# Patient Record
Sex: MALE | Race: WHITE | NOT HISPANIC OR LATINO | Employment: FULL TIME | URBAN - METROPOLITAN AREA
[De-identification: names, ages, dates, MRNs, and addresses within clinical notes are randomized per-mention and may not be internally consistent; named-entity substitution may affect disease eponyms.]

---
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
## 2022-03-27 ENCOUNTER — HOSPITAL ENCOUNTER (EMERGENCY)
Facility: CLINIC | Age: 22
Discharge: HOME | End: 2022-03-27
Attending: PHYSICIAN ASSISTANT | Admitting: PHYSICIAN ASSISTANT

## 2022-03-27 VITALS
RESPIRATION RATE: 15 BRPM | BODY MASS INDEX: 18.48 KG/M2 | TEMPERATURE: 98.2 F | OXYGEN SATURATION: 97 % | HEART RATE: 75 BPM | DIASTOLIC BLOOD PRESSURE: 63 MMHG | WEIGHT: 115 LBS | SYSTOLIC BLOOD PRESSURE: 123 MMHG | HEIGHT: 66 IN

## 2022-03-27 DIAGNOSIS — K08.89 PAIN, DENTAL: ICD-10-CM

## 2022-03-27 PROCEDURE — 99283 EMERGENCY DEPT VISIT LOW MDM: CPT | Mod: 25

## 2022-03-27 PROCEDURE — 64400 NJX AA&/STRD TRIGEMINAL NRV: CPT

## 2022-03-27 PROCEDURE — 250N000009 HC RX 250: Performed by: PHYSICIAN ASSISTANT

## 2022-03-27 RX ORDER — CHLORHEXIDINE GLUCONATE ORAL RINSE 1.2 MG/ML
15 SOLUTION DENTAL 2 TIMES DAILY
Qty: 118 ML | Refills: 0 | Status: SHIPPED | OUTPATIENT
Start: 2022-03-27

## 2022-03-27 RX ORDER — BUPIVACAINE HYDROCHLORIDE AND EPINEPHRINE 5; 5 MG/ML; UG/ML
1.8 INJECTION, SOLUTION PERINEURAL ONCE
Status: COMPLETED | OUTPATIENT
Start: 2022-03-27 | End: 2022-03-27

## 2022-03-27 RX ADMIN — BUPIVACAINE HYDROCHLORIDE AND EPINEPHRINE BITARTRATE 1.8 ML: 5; .005 INJECTION, SOLUTION SUBCUTANEOUS at 20:16

## 2022-03-27 ASSESSMENT — ENCOUNTER SYMPTOMS
VOMITING: 0
SORE THROAT: 0
FEVER: 0

## 2022-03-28 NOTE — DISCHARGE INSTRUCTIONS
-Continue the oral antibiotic as prescribed.   -Continue motrin and/or Tylenol as needed for discomfort.     Discharge Instructions  Dental Pain    You have been seen today for a toothache. Your pain may be caused by an exposed nerve, an infection (pulpitis), a root abscess (pocket of pus), or other problems. You will need to see a dentist for a solution to your tooth problem. Emergency Department care is only to help control your problem until you can see a dentist; we cannot provide complete dental care.  Today, we did not find any sign that your toothache was caused by any dangerous or life-threatening condition, but sometimes symptoms develop over time and cannot be found during an emergency visit, so it is very important that you follow up with your dentist.      Generally, every Emergency Department visit should have a follow-up clinic visit with either a primary or a specialty clinic/provider. Please follow-up as instructed by your emergency provider today.    Return to the Emergency Department if:  You develop a new fever over 100.4 F.  You cannot open your mouth normally, cannot move your tongue well, or cannot swallow.  You have new or increased swelling of your face or neck.  You develop drainage of pus or foul smelling material from around your tooth.  What can I do to help myself?  Take any antibiotic the provider may have prescribed for you today.  Avoid very hot or very cold foods as both can cause pain.  Make an appointment to see a dentist as soon as possible. Dentists are generally not  on-staff  at hospitals so we cannot  refer  to you to dentist but we may be able to provide a list of dental clinics to help you.  If you were given a prescription for medicine here today, be sure to read all of the information (including the package insert) that comes with your prescription.  This will include important information about the medicine, its side effects, and any warnings that you need to know about.   The pharmacist who fills the prescription can provide more information and answer questions you may have about the medicine.  If you have questions or concerns that the pharmacist cannot address, please call or return to the Emergency Department.   Remember that you can always come back to the Emergency Department if you are not able to see your regular provider in the amount of time listed above, if you get any new symptoms, or if there is anything that worries you.    The examination and treatment you have received in the Emergency Department has been rendered on an emergency basis only and not intended to be a substitute for complete ongoing medical care.

## 2022-03-28 NOTE — ED PROVIDER NOTES
"  History   Chief Complaint:  Dental Pain     HPI   Po Salmeron is a 21 year old male with history of tobacco use who presents with dental pain. The patient states that last night they developed tooth pain in the upper jaw. He reports hat he went to the ED last night where they did a dental block and was given a prescription for Pen-Vee-K 500 mg along with Advil. He notes that he has also been using these with mild relief. He also said that the tooth broke a while ago and has an appointment with his dentist in a month. He says that he has trouble breathing secondary to pain (cold air hurts the tooth) and that it hurts to chew on that side. He denies any fever, vomiting, ear pain, throat pain or chest pain.    Review of Systems   Constitutional: Negative for fever.   HENT: Positive for dental problem. Negative for ear pain and sore throat.    Cardiovascular: Negative for chest pain.   Gastrointestinal: Negative for vomiting.   All other systems reviewed and are negative.    Allergies:  The patient has no known allergies.     Medications:  Pen-Vee    Past Medical History:     Eosinophilia  Asthma  Hypovitaminosis D  PTSD    Past Surgical History:    Hernia repair    Social History:  The patient presents to the ED with his wife.  The patient is a smoker.  The patient has a dentist.    Physical Exam     Patient Vitals for the past 24 hrs:   BP Temp Temp src Pulse Resp SpO2 Height Weight   03/27/22 1907 123/63 98.2  F (36.8  C) Temporal 75 15 97 % 1.676 m (5' 6\") 52.2 kg (115 lb)     Physical Exam  Vitals and nursing note reviewed.   Constitutional:       General: He is not in acute distress.     Appearance: Normal appearance. He is not ill-appearing, toxic-appearing or diaphoretic.   HENT:      Head: Normocephalic.      Right Ear: Tympanic membrane, ear canal and external ear normal.      Left Ear: Tympanic membrane, ear canal and external ear normal.      Mouth/Throat:      Mouth: Mucous membranes are moist.      " Pharynx: Oropharynx is clear. No oropharyngeal exudate or posterior oropharyngeal erythema.      Comments: Poor dentition with decay throughout.  Left back upper molar TTP.  Broken (#15).  No associated gum swelling/fluctuance.  No facial cellulitis/swelling.  No trismus.  Oropharynx unremarkable.  Clear speech.   Eyes:      General:         Right eye: No discharge.         Left eye: No discharge.      Conjunctiva/sclera: Conjunctivae normal.   Pulmonary:      Effort: Pulmonary effort is normal.   Musculoskeletal:         General: Normal range of motion.      Cervical back: Normal range of motion and neck supple.   Skin:     General: Skin is warm.      Capillary Refill: Capillary refill takes less than 2 seconds.   Neurological:      General: No focal deficit present.      Mental Status: He is alert.   Psychiatric:         Mood and Affect: Mood normal.         Behavior: Behavior normal.         Thought Content: Thought content normal.         Judgment: Judgment normal.       Emergency Department Course     Procedures    Dental Block     INDICATIONS:  Dental Pain    LOCATION:  Left upper back molar (#15)  ANESTHESIA: Regional block using 0.5% Bupivacaine with Epinephrine, total of 1.8 mLs   PROCEDURE NOTE: The patient tolerated the procedure well with good relief of discomfort and there were no complications.    Emergency Department Course:     Reviewed:  I reviewed nursing notes, vitals, past medical history and Care Everywhere  3/27/22 (earlier today): [facility] ED: Dental pain.  Dental block. Rx ABIODUNN/motrin. #13 painful.     Assessments:   I obtained history and examined the patient as noted above.      I rechecked the patient and performed procedure as above.     Disposition:  The patient was discharged to home.     Impression & Plan     Medical Decision Makin y/o male presents for persistent dental pain.  Of note seen at [facility] early this AM for same,  Dental block given (that note indicates #13 most  painful.   On exam at this  Time #15 with broken TTP tooth.  Pt with diffuse dental decay/poor dentition).  Pain improved and then returned. Presents here primarily for repeat block.     On exam, not acutely ill appearing. No concern for deep space infection at this time and no drainable dental abscess on exam.  Discussed feel reasonable to reblock area but stressed importance of dentist as continued recurrent dental blocks for pain control could put pt at potential risk of nerve damage, etc.  Block completed, pt tolerated well.  To continue PCN/motrin as Rx earlier today.  I will add peridex as well.  Additional dental resources provided as he reports his established dentist can not see him until late April. Pt educated on S/S that should prompt ED re-eval.  Questions answered. Verbalized understanding. Comfortable with plan and appreciative.     Diagnosis:    ICD-10-CM    1. Pain, dental  K08.89      Discharge Medications:  Discharge Medication List as of 3/27/2022  8:23 PM      START taking these medications    Details   chlorhexidine (PERIDEX) 0.12 % solution Swish and spit 15 mLs in mouth 2 times daily, Disp-118 mL, R-0, E-Prescribe           Scribe Disclosure:  I, Sarkis Ellison, am serving as a scribe at 7:53 PM on 3/27/2022 to document services personally performed by Mirlande Joshi PA-C based on my observations and the provider's statements to me.     I, Noble Bernard, am serving as a scribe  at 8:31 PM on 3/27/2022 to document services personally performed by Mirlande Joshi PA-C based on my observations and the provider's statements to me.            Mirlande Joshi PA-C  03/27/22 2050

## 2023-04-25 ENCOUNTER — HOSPITAL ENCOUNTER (EMERGENCY)
Facility: HOSPITAL | Age: 23
Discharge: HOME | End: 2023-04-25

## 2023-04-25 VITALS
WEIGHT: 120 LBS | HEIGHT: 66 IN | SYSTOLIC BLOOD PRESSURE: 139 MMHG | DIASTOLIC BLOOD PRESSURE: 74 MMHG | RESPIRATION RATE: 16 BRPM | BODY MASS INDEX: 19.29 KG/M2 | HEART RATE: 87 BPM | OXYGEN SATURATION: 99 % | TEMPERATURE: 99.4 F

## 2023-04-25 DIAGNOSIS — K03.81 NONTRAUMATIC BROKEN OR CRACKED TOOTH: ICD-10-CM

## 2023-04-25 DIAGNOSIS — G89.29 CHRONIC DENTAL PAIN: ICD-10-CM

## 2023-04-25 DIAGNOSIS — K08.9 CHRONIC DENTAL PAIN: ICD-10-CM

## 2023-04-25 PROCEDURE — 64400 NJX AA&/STRD TRIGEMINAL NRV: CPT

## 2023-04-25 PROCEDURE — 99284 EMERGENCY DEPT VISIT MOD MDM: CPT | Mod: 25

## 2023-04-25 PROCEDURE — 96372 THER/PROPH/DIAG INJ SC/IM: CPT

## 2023-04-25 PROCEDURE — 250N000009 HC RX 250

## 2023-04-25 PROCEDURE — 250N000011 HC RX IP 250 OP 636

## 2023-04-25 RX ORDER — IBUPROFEN 600 MG/1
600 TABLET, FILM COATED ORAL EVERY 6 HOURS PRN
Qty: 30 TABLET | Refills: 0 | Status: SHIPPED | OUTPATIENT
Start: 2023-04-25

## 2023-04-25 RX ORDER — BUPIVACAINE HYDROCHLORIDE AND EPINEPHRINE 5; 5 MG/ML; UG/ML
1.8 INJECTION, SOLUTION PERINEURAL ONCE
Status: COMPLETED | OUTPATIENT
Start: 2023-04-25 | End: 2023-04-25

## 2023-04-25 RX ORDER — KETOROLAC TROMETHAMINE 15 MG/ML
15 INJECTION, SOLUTION INTRAMUSCULAR; INTRAVENOUS ONCE
Status: COMPLETED | OUTPATIENT
Start: 2023-04-25 | End: 2023-04-25

## 2023-04-25 RX ADMIN — KETOROLAC TROMETHAMINE 15 MG: 15 INJECTION, SOLUTION INTRAMUSCULAR; INTRAVENOUS at 15:08

## 2023-04-25 RX ADMIN — BUPIVACAINE HYDROCHLORIDE AND EPINEPHRINE BITARTRATE 1.8 ML: 5; .005 INJECTION, SOLUTION SUBCUTANEOUS at 15:08

## 2023-04-25 ASSESSMENT — ENCOUNTER SYMPTOMS
FEVER: 0
CHILLS: 0
SORE THROAT: 0

## 2023-04-25 ASSESSMENT — ACTIVITIES OF DAILY LIVING (ADL): ADLS_ACUITY_SCORE: 33

## 2023-04-25 NOTE — ED PROVIDER NOTES
EMERGENCY DEPARTMENT ENCOUNTER      NAME: Po Salmeron  AGE: 22 year old male  YOB: 2000  MRN: [id number]  EVALUATION DATE & TIME: No admission date for patient encounter.    PCP: [facility]    ED PROVIDER: Maura Kenny PA-C    Chief Complaint   Patient presents with     Dental Pain     FINAL IMPRESSION:  1. Chronic dental pain    2. Nontraumatic broken or cracked tooth      MEDICAL DECISION MAKING:    Pertinent Labs & Imaging studies reviewed. (See chart for details)  Po Salmeron is a 22 year old male who presents for evaluation of right lower tooth pain for the past 4 days.  Patient with history of chronic tooth pain with multiple ED visits over the past few months.  Most recently seen at [facility] on 4/24/2023 for right lower dental pain, was given dental block and advised to follow-up with dentist as previously scheduled..  Prescribed clindamycin on 4/21/2023, however reports has not been taking this medication as he never picked it up from the pharmacy.  Patient reports ongoing dental pain that started again today, had relief from dental block until today. Denies any injury to the tooth.  Went to his dentist this morning and they were unable to see him, but scheduled him for an appointment for Friday 4/28/2023.    On my initial evaluation, patient mildly hypertensive, vital signs remainder of vitals are normal.  Hypertension improved on discharge. on physical exam patient is awake, alert, no acute distress, resting in chair.  He is uncomfortable appearing, clutching his right lower jaw, but does not appear ill or toxic.  On inspection of his mouth, he has poor dentition with several missing and cracked teeth.  Many teeth have fillings and crowns.  He is tender on palpation to his right lower tooth, which is tooth #30.  This tooth is cracked.  No obvious purulent drainage, fluctuance or abscess appreciated.  Tender on palpation to his right lower mandible, no facial  "swelling, erythema or warmth.  His uvula is midline, tonsils are symmetric without enlargement.  He is tolerating secretions appropriately.  No tenderness on palpation under his tongue, no induration, no change to voice.  Opens jaw appropriately without significant pain.    Differential diagnosis includes dental caries, pulpitis, periapical abscess, peritonsillar abscess, retropharyngeal abscess, Marco's angina, exposed nerve, TMJ, neuralgia, trauma, other dental abscess. Patient was given IM Toradol and dental block with significant improvement in symptoms.    Suspect his pain is due to an exposed nerve from his cracked teeth or pulpitis, no obvious signs of abscess.  I did perform a dental block with significant improvement per patient.  On recheck after dental block, patient clinically appears much more comfortable and is sitting smiling in chair saying \"thank you so much, I feel so much better\".  Patient does have an appointment with his dentist this coming Friday.  He was prescribed clindamycin during a previous visit, however reports he did not pick this up.  Would like a different prescription sent to a different pharmacy, I sent in Augmentin to his pharmacy of choice as well as ibuprofen.  Otherwise reassuring exam and work-up today.  No signs of periapical abscess, peritonsillar abscess, retropharyngeal abscess as tonsils are symmetric and not enlarged, no change to voice or enlargement of tongue to suggest Marco's.  No difficulty opening jaw or jaw pain to suggest TMJ.  Low suspicion for any emergent process that require further ER intervention or hospital admission.  Provided expectant management as well as strict return precautions.    Patient has had serial examinations and notes significant improvement.     Patient was discharged in stable condition with treatment plan as below. Instructed to follow up with primary care provider in 3 days and with dentist on Friday as previously scheduled and " return to the emergency department with any new or worsening of symptoms. Patient expressed understanding, feels comfortable, and is in agreement with this plan. All questions addressed prior to discharge.    Medical Decision Making    History:    Supplemental history from: Documented in chart, if applicable    External Record(s) reviewed: Documented in chart, if applicable.    Work Up:    Chart documentation includes differential considered and any EKGs or imaging independently interpreted by provider, where specified.    In additional to work up documented, I considered the following work up: Documented in chart, if applicable.    External consultation:    Discussion of management with another provider: Documented in chart, if applicable    Complicating factors:    Care impacted by chronic illness: N/A    Care affected by social determinants of health: N/A    Disposition considerations: Discharge. I prescribed additional prescription strength medication(s) as charted. N/A.    ED COURSE:  2:49 PM  I reviewed the patient's chart. I met with the patient to gather history and to perform my initial exam.    I wore appropriate PPE during this encounter including: facemask & eye protection   3:07 PM I completed a dental block procedure on patient.   3:32 PM I rechecked the patient. He reports feeling significantly improved. We discussed plan for discharge including treatment plan, follow-up and return precautions to emergency department.  Patient voiced understanding and in agreement with this plan.    At the conclusion of the encounter I discussed the results of all of the tests and the disposition. The questions were answered. The patient or family acknowledged understanding and was agreeable with the care plan.     MEDICATIONS GIVEN IN THE EMERGENCY:  Medications   ketorolac (TORADOL) injection 15 mg (15 mg Intramuscular $Given 4/25/23 8318)   bupivacaine 0.5 % EPINEPHrine 1:200,000 0.5% -1:291630 dental injection  SOLN 1.8 mL (1.8 mLs Intradermal $Given 4/25/23 2832)     NEW PRESCRIPTIONS STARTED AT TODAY'S ER VISIT  Discharge Medication List as of 4/25/2023  3:37 PM      START taking these medications    Details   amoxicillin-clavulanate (AUGMENTIN) 875-125 MG tablet Take 1 tablet by mouth 2 times daily for 10 days, Disp-20 tablet, R-0, E-Prescribe      ibuprofen (ADVIL/MOTRIN) 600 MG tablet Take 1 tablet (600 mg) by mouth every 6 hours as needed for moderate pain, Disp-30 tablet, R-0, E-Prescribe           =================================================================    HPI:    Patient information was obtained from: Patient     Use of Interpretor: N/A      Po Salmeron is a 22 year old male with a pertinent history of dental abscess, asthma, who presents to this ED via private car for evaluation of dental pain.     Per chart review, patient was seen at [facility] on 4/24 for dental pain. Dental block done and patient discharged home. Patient was also seen 3/2/2023 [facility] dental pain, 4/21/2023 [facility] dental pain, 4/21/2023 [facility] dental pain, 4/24/2023 arrived at [facility] but was not seen for dental pain. Plan to follow up with dentist.     Patient reports lower right dental pain since 4/21. He was seen  in the ED on 4/21 and given a dental block which improved his pain until today. He has a dental appointment 4/28 but is unable to bear the pain. His pain is similar to previous dental issues. He has taken ibuprofen without relief. No antibiotic use.     Denies fever, chills, and sore throat.       REVIEW OF SYSTEMS:  Review of Systems   Constitutional: Negative for chills and fever.   HENT: Positive for dental problem. Negative for sore throat.    All other systems reviewed and are negative.     PAST MEDICAL HISTORY:  Past Medical History:   Diagnosis Date     Uncomplicated asthma        PAST SURGICAL HISTORY:  No past surgical history on file.    CURRENT MEDICATIONS:    No current facility-administered  "medications for this encounter.    Current Outpatient Medications:      amoxicillin-clavulanate (AUGMENTIN) 875-125 MG tablet, Take 1 tablet by mouth 2 times daily for 10 days, Disp: 20 tablet, Rfl: 0     ibuprofen (ADVIL/MOTRIN) 600 MG tablet, Take 1 tablet (600 mg) by mouth every 6 hours as needed for moderate pain, Disp: 30 tablet, Rfl: 0     chlorhexidine (PERIDEX) 0.12 % solution, Swish and spit 15 mLs in mouth 2 times daily, Disp: 118 mL, Rfl: 0    ALLERGIES:  No Known Allergies    FAMILY HISTORY:  No family history on file.    SOCIAL HISTORY:   Social History     Socioeconomic History     Marital status:        VITALS:  Patient Vitals for the past 24 hrs:   BP Temp Pulse Resp SpO2 Height Weight   04/25/23 1533 139/74 -- 87 16 99 % -- --   04/25/23 1449 (!) 154/82 99.4  F (37.4  C) 90 16 96 % -- --   04/25/23 1447 -- -- -- -- -- 1.676 m (5' 6\") 54.4 kg (120 lb)       PHYSICAL EXAM    Constitutional: Well developed, Well nourished, NAD, uncomfortable appearing, clutching his right lower jaw, but does not appear ill or toxic.  HENT: On inspection of his mouth, he has poor dentition with several missing and cracked teeth.  Many teeth have fillings and crowns.  He is tender on palpation to his right lower tooth, which is tooth #30.  This tooth is cracked.  No obvious purulent drainage, fluctuance or abscess appreciated.  Tender on palpation to his right lower mandible, no facial swelling, erythema or warmth.  His uvula is midline, tonsils are symmetric without enlargement.  He is tolerating secretions appropriately.  No tenderness on palpation under his tongue, no induration, no change to voice.  Opens jaw appropriately without significant pain. Normocephalic, Atraumatic, Bilateral external ears normal,, mucous membranes moist, Nose normal.   Neck: Normal range of motion, No tenderness, Supple, No stridor.  Eyes: PERRL, EOMI, Conjunctiva normal, No discharge.   Integument: Warm, Dry, No erythema, No rash. " No petechiae.  Neurologic: Alert & oriented x 3, Normal motor function, Normal sensory function, No focal deficits noted. Normal gait.  Psychiatric: Affect normal, Judgment normal, Mood normal. Cooperative.    LAB:  All pertinent labs reviewed and interpreted.  Labs Ordered and Resulted from Time of ED Arrival to Time of ED Departure - No data to display    RADIOLOGY:  Reviewed all pertinent imaging. Please see official radiology report.  No orders to display       PROCEDURES:   PROCEDURE: Dental Nerve Block   INDICATIONS: Dental pain   PROCEDURE PROVIDER: Maura Kenny PA-C   SITE: Right Inferior Alveolar (mandibular) Nerve to achieve anesthesia of Tooth #30     MEDICATION: 7 mL of 0.25% Bupivacaine without epinephrine   NOTE: The usual mandibular landmarks were identified and the needle was positioned at the midpoint of the mandibular borders.  Area was aspirated and there was no return of blood.  I then injected the medication into the site.    COMPLICATIONS: Patient tolerated procedure well, without complication     Diagnosis:  1. Chronic dental pain    2. Nontraumatic broken or cracked tooth      I, Alee Dickey, am serving as a scribe to document services personally performed by Maura Kenny PA-C based on my observation and the provider's statements to me. I, Maura Kenny PA-C attest that Alee Dickey is acting in a scribe capacity, has observed my performance of the services and has documented them in accordance with my direction.    Maura Kenny PA-C  Emergency Medicine  [facility]  4/25/2023       Maura Kenny PA-C  04/25/23 1606

## 2023-04-25 NOTE — DISCHARGE INSTRUCTIONS
Please bring this paperwork with you to your follow-up appointment.    You were seen in the urgent care/emergency department for tooth pain.     We suspect you have a cracked tooth that is leading to an exposed nerve, which is likely causing your pain.  You were given a dental block today with some improvement of your pain.  It is very important to take the antibiotic to help with the pain and inflammation.  Please take Augmentin twice a day for 10 days.  Please follow-up with your dentist as previously scheduled on Friday for possible tooth removal.     For your symptoms:  Use salt water rinses    Tylenol/ibuprofen as needed  You may take up to 650 mg of Tylenol (acetaminophen) up to 4 times daily and up to 600 mg of ibuprofen up to 4 times daily as needed for fever, pain.  Please do not take more than the daily maximum recommended dose (tylenol = 4 grams, ibuprofen = 2.4 grams) as it can cause harm to your liver, kidneys, stomach.  It is best to take ibuprofen with food. Please read labels of any over-the-counter medicine you may be taking as it may contain Tylenol (acetaminophen) or Advil (ibuprofen).     Follow up with your primary care provider for recheck in 3 days for ER follow-up and with dentist as previously scheduled    Return to the emergency department if you develop worsening pain, vomiting, headache, fevers, chills, or any other new worsening or concerning symptoms. We'd be happy to see you again.    Thank you for allowing us to be part of your care today.    Take care!  -Maura Kenny PA-C     Dental Clinics with no or reduced fees    [facility]   [facility]  [address], [place] [facility]  [phone] Accepts [place]    [facility]  [address] , [place]  [phone] No Fee Hours and services vary each month - call them before going.  Sometimes only offer extractions.   [facility]  [address], [place]  [phone] Sliding fee: ER visit - $50  up front  regular - $35 up front Call or arrive at 7:45 AM on Mondays, Tues, or Thursdays to sign up for same-day appointments for dental pain / emergencies.        [facility] Sliding  fee  Call for details Walk-ins and same-day appointments  Walk-in's accepted 8-11AM and 1-4PM  Monday-Friday   [address]     [phone]          [facility] dental Sliding fee If no insurance, call first.    [address], [facility]     [phone]          [facility]  [address], [facility]  [phone] Sliding fee Call 8:00 AM Mon-Thurs for next-day  appointments (for emergencies/pain only) Help with [place] is available.        [facility]  [address]  [phone] Call for fees Walk-in appointments available from 8am-3:30pm.  Standard appointments also available.              [facility]  [address] [facility]  [address]  [phone] No cost Open Monday- Thursday, 8am-9pm        [facility]   [address]  [phone] Sliding fee  $40 up front No walk-ins. Call at 8AM Mon-Fri for same- day visits.  Can schedule Saturday emergency visits by calling Friday morning.   [facility]  [address]  [phone] Sliding fee  Call for details No walk-ins.  For emergency appointments:  Call on Thursday 3PM (for Friday appt) OR Call on Friday 3PM (for Monday appt)   [facility]  [address] [place] [address]  [phone] Sliding fee- Call for Details Mon, Tues 7am-5:30pm  Wed 7am-8pm  Thurs 7am-7pm  Fri 8am-5pm   [facility]  [address] [facility]  [phone] Sliding fee -   Call for details Mon, Tues, Thurs, Fri- 8am-4:30 PM  Wed 8:30 AM to 8 pm             OTHER RESOURCES       Emergency Dental [facility],   [address]  [address] [place]  [address]  [phone]    Referral Service,  1-800-DENTIST        Open 9a to 9p 7 days a week               [employer], 8-922-034-4190 For people who are elderly, disabled, or medically compromised and have no other way to pay for dental care. Call to get an application. If approved, services are provided through volunteer dentists.        REVISED 2018-10

## 2023-04-25 NOTE — ED TRIAGE NOTES
Pt comes in for R lower dental pain starting Friday. Pt believes there is cavities in the tooth. Pt went to dentist prior to coming here they cant take him any earlier so told him to come to ER for dental block.

## 2023-04-25 NOTE — ED NOTES
Patient  reviewed discharge.  Discussed printed discharge information.  Follow-up appts reviewed.   What s/s warrant return to the ER.  Prescriptions and how to take them.  Importance of social distancing, good hand hygiene

## 2024-01-09 ENCOUNTER — HOSPITAL ENCOUNTER (EMERGENCY)
Facility: HOSPITAL | Age: 24
Discharge: HOME | End: 2024-01-09
Attending: STUDENT IN AN ORGANIZED HEALTH CARE EDUCATION/TRAINING PROGRAM | Admitting: STUDENT IN AN ORGANIZED HEALTH CARE EDUCATION/TRAINING PROGRAM

## 2024-01-09 VITALS
HEIGHT: 66 IN | SYSTOLIC BLOOD PRESSURE: 127 MMHG | BODY MASS INDEX: 20.09 KG/M2 | RESPIRATION RATE: 16 BRPM | TEMPERATURE: 98.3 F | DIASTOLIC BLOOD PRESSURE: 73 MMHG | OXYGEN SATURATION: 97 % | HEART RATE: 76 BPM | WEIGHT: 125 LBS

## 2024-01-09 DIAGNOSIS — K08.89 PAIN, DENTAL: ICD-10-CM

## 2024-01-09 PROCEDURE — 99283 EMERGENCY DEPT VISIT LOW MDM: CPT

## 2024-01-09 PROCEDURE — 64400 NJX AA&/STRD TRIGEMINAL NRV: CPT

## 2024-01-09 RX ORDER — OXYCODONE HYDROCHLORIDE 5 MG/1
5 TABLET ORAL ONCE
Status: COMPLETED | OUTPATIENT
Start: 2024-01-10 | End: 2024-01-09

## 2024-01-09 RX ORDER — OXYCODONE HYDROCHLORIDE 5 MG/1
5 TABLET ORAL EVERY 6 HOURS PRN
Qty: 12 TABLET | Refills: 0 | Status: SHIPPED | OUTPATIENT
Start: 2024-01-09 | End: 2024-01-12

## 2024-01-10 NOTE — ED PROVIDER NOTES
EMERGENCY DEPARTMENT ENCOUNTER      NAME: Po Salmeron  AGE: 23 year old male  YOB: 2000  MRN: [id number]  EVALUATION DATE & TIME: No admission date for patient encounter.    PCP: [facility]    ED PROVIDER: Jesus Garza MD      Chief Complaint   Patient presents with    Dental Pain         FINAL IMPRESSION:  1. Pain, dental          ED COURSE & MEDICAL DECISION MAKING:    Pertinent Labs & Imaging studies reviewed. (See chart for details)  23 year old male presents to the Emergency Department for evaluation of dental pain.    Patient has poor dentition, and appears that he has an impacted wisdom tooth on his left maxillary teeth.  He also has a cavity next to it with that molar.  Offered the patient a dental block, counseling that this is temporary, and ultimately will need a dentist.  The patient has an appointment in 2 weeks.  I also provided him with the emergency dental care options in the [facility].  I provided the patient with a short course of oxycodone.  He declined using any in the emergency department, as he states that the does not want to be drowsy.  Return precautions and discharged after a successful nerve block.         Medical Decision Making    History:  Supplemental history from: Documented in chart  External Record(s) reviewed: Documented in chart    Work Up:  Chart documentation includes differential considered and any EKGs or imaging independently interpreted by provider, where specified.  In additional to work up documented, I considered the following work up: Documented in chart, if applicable.    External consultation:  Discussion of management with another provider: Documented in chart, if applicable    Complicating factors:  Care impacted by chronic illness: N/A  Care affected by social determinants of health: N/A    Disposition considerations: Discharge. I prescribed additional prescription strength medication(s) as charted. See documentation  "for any additional details.        At the conclusion of the encounter I discussed the results of all of the tests and the disposition. The questions were answered. The patient or family acknowledged understanding and was agreeable with the care plan.     0 minutes of critical care time     MEDICATIONS GIVEN IN THE EMERGENCY:  Medications   oxyCODONE (ROXICODONE) tablet 5 mg (5 mg Oral Not Given 1/9/24 4307)       NEW PRESCRIPTIONS STARTED AT TODAY'S ER VISIT  Discharge Medication List as of 1/9/2024 11:49 PM        START taking these medications    Details   oxyCODONE (ROXICODONE) 5 MG tablet Take 1 tablet (5 mg) by mouth every 6 hours as needed for breakthrough pain, Disp-12 tablet, R-0, Local Print                =================================================================    HPI    Patient information was obtained from: patient    Use of : N/A        Po Salmeron is a 23 year old male who presents to this ED for evaluation of tooth pain.  Patient has poor dental health, and has been going to several dentist to treat pain for many months, he has pain in his left upper tooth currently, which feels like his wisdom tooth pain he had prior to extraction of his right lower.  He denies fevers, difficulty speaking or swallowing.  He has been using Tylenol and ibuprofen on a schedule at appropriate doses, and not achieving relief.      PAST MEDICAL HISTORY:  Past Medical History:   Diagnosis Date    Uncomplicated asthma        PAST SURGICAL HISTORY:  No past surgical history on file.        CURRENT MEDICATIONS:    oxyCODONE (ROXICODONE) 5 MG tablet  chlorhexidine (PERIDEX) 0.12 % solution  ibuprofen (ADVIL/MOTRIN) 600 MG tablet        ALLERGIES:  No Known Allergies    FAMILY HISTORY:  No family history on file.    SOCIAL HISTORY:   Social History     Socioeconomic History    Marital status:        VITALS:  /73   Pulse 76   Temp 98.3  F (36.8  C) (Temporal)   Resp 16   Ht 1.676 m (5' 6\")  "  Wt 56.7 kg (125 lb)   SpO2 97%   BMI 20.18 kg/m      PHYSICAL EXAM    Physical Exam  Vitals and nursing note reviewed.   Constitutional:       General: He is not in acute distress.     Appearance: Normal appearance. He is normal weight. He is not ill-appearing.   HENT:      Head: Normocephalic and atraumatic.      Nose: Nose normal.      Mouth/Throat:      Mouth: Mucous membranes are moist.      Pharynx: Oropharynx is clear.      Comments: Poor dentition  Eyes:      Extraocular Movements: Extraocular movements intact.      Conjunctiva/sclera: Conjunctivae normal.      Pupils: Pupils are equal, round, and reactive to light.   Cardiovascular:      Rate and Rhythm: Normal rate.      Pulses: Normal pulses.   Pulmonary:      Effort: Pulmonary effort is normal. No respiratory distress.   Abdominal:      General: Abdomen is flat. There is no distension.   Musculoskeletal:         General: Normal range of motion.      Cervical back: Normal range of motion and neck supple.   Skin:     General: Skin is warm and dry.      Capillary Refill: Capillary refill takes less than 2 seconds.   Neurological:      Mental Status: He is alert and oriented to person, place, and time. Mental status is at baseline.   Psychiatric:         Mood and Affect: Mood normal.         Behavior: Behavior normal.         Thought Content: Thought content normal.         Judgment: Judgment normal.            LAB:  All pertinent labs reviewed and interpreted.       RADIOLOGY:  Reviewed all pertinent imaging. Please see official radiology report.  No orders to display       PROCEDURES:   PROCEDURE: Dental Nerve Block   INDICATIONS: Dental pain   PROCEDURE PROVIDER: Dr Jesus Garza   SITE: Left superior Alveolar (maxillary) Nerve, greater palatine nerve to achieve anesthesia of Tooth #16     MEDICATION: 2.5 mL of 0.25% Bupivacaine without epinephrine   NOTE: The usual maxillary landmarks were identified and the needle was angled superiorly and  medially along the maxilla.  Area was aspirated and there was no return of blood.  I then injected the medication into the site.    COMPLICATIONS: Patient tolerated procedure well, without complication           [facility] Documentation:   CMS Diagnoses:               Jesus Garza MD  [facility] EMERGENCY DEPARTMENT  [address] [place] [id number]  [phone]       Jesus Garza MD  01/10/24 0006

## 2024-01-10 NOTE — ED TRIAGE NOTES
Patient presents with dental pain in left upper side of jaw, last tooth. Patient has a dental appointment in 2 weeks. Patient believes it could be a wisdom tooth.     Triage Assessment (Adult)       Row Name 01/09/24 3578          Triage Assessment    Airway WDL WDL        Respiratory WDL    Respiratory WDL WDL        Skin Circulation/Temperature WDL    Skin Circulation/Temperature WDL WDL        Cardiac WDL    Cardiac WDL WDL        Peripheral/Neurovascular WDL    Peripheral Neurovascular WDL WDL        Cognitive/Neuro/Behavioral WDL    Cognitive/Neuro/Behavioral WDL WDL

## 2024-01-10 NOTE — DISCHARGE INSTRUCTIONS
You were seen in the ER for evaluation of dental pain. You were treated with a dental block.    Rest, ice, soft diet, Tylenol and/or ibuprofen for pain. You may take oxycodone for severe pain - do NOT drive on this medication as it can cause drowsiness. Additionally, please take a stool softener as this medication can cause constipation. This medication can be addicting, please limit your use and discard any remaining tablets.     Tylenol (Acetaminophen) Discharge Instructions:  You may take 2 tablets of regular strength, over-the-counter, Tylenol (acetaminophen) every 4-6 hours as needed for pain.  Take no more than 4000 mg of Tylenol in a 24-hour period.      Avoid taking more than 1 acetaminophen-containing product at a time and be aware that many over-the-counter medications contain a combination of acetaminophen and other products.  If you are taking Tylenol in addition to a combination product please keep track of your daily acetaminophen dose to make sure you do not exceed the recommended 4000 mg.  Taking too much acetaminophen can cause permanent damage to your liver.    Ibuprofen/Naproxen Discharge Instructions:  You may take ibuprofen for pain control.  The maximum dose of (ibuprofen is 3200 mg ) in a 24-hour period.    Take this medication with food to prevent stomach irritation.  With long-term use this medication can irritate the stomach causing pain and lead to development of a stomach ulcer.  If you notice stomach pain or vomiting of coffee-ground colored vomit or blood, please be seen by a healthcare provider.  Attempt to use this medication for the shortest time possible.      Follow-up with your dentist as soon as possible for reevaluation and definitive management with possible tooth extraction.    Return to the emergency department for any new or worsening symptoms including severe pain, facial swelling/redness/warmth, mouth or tongue swelling, difficulty swallowing or opening your jaw, shortness  of breath, chest pain, uncontrollable fever/chills, or any other concerning symptoms.     Low/NoCost Dental Clinics    [facility] EM[facility]GENCY DENTAL CLINIC  [phone]    [facility]  [address] [place] [address]  [phone]    [facility]  [address] [place] [address]  [phone]    [facility]  [address] [place] [address]  [phone]    [facility]  [address] [place] [address]  [phone]    [facility]  [address] [place] [address]  [phone]    [facility]  [address] [place] [address], [address], [place], [place]   [phone]    [facility]  [address] [place] [address]  [phone]    [facility]   [address] , [address], [place] [address]  [phone]    [facility]  [address] [place] [address]  [phone]    [facility]  [email or web address]  [address] [place] [address]  [phone]  They will sometimes get you in same-day but canusually schedule your for one week out if you call first thing when they open, 8:30AM Monday to Friday.     [facility]  [address] [place] [address]  [phone]    [facility]  Call for locations.  [phone]    [facility]  [address] [place], [place] [address] [phone]    [facility]   [email or web address]  [address] [place], [place] [address]  [phone]  They will take walk-ins if you show up at opening time, 8am Monday to Friday)    [facility]  [address] [place] [address]  [phone]    [facility]  [address] [place] [address]  [phone] or  [phone]    [facility]  [phone]    [facility]  [address] [place] [address]  [phone] (Adult)  [phone] (Children)    [facility]  [address] [place] [address]  [phone]    [place]   Name Eligibility Fee   [facility] Anyone [place]   [address] [facility] Sliding Fee   [phone]; option #4     [place]   Name Eligibility Fee   [facility] Anyone Insurance   [address] Sliding Fee   [phone] [facility]     [place]   Name Eligibility Fee   [facility] Anyone Self Pay   [address] [facility] 50% of for Seniors   [phone]     [place]   Name Eligibility Fee   [facility] Anyone Self Pay   [address] [facility]   [phone]     [place]   Name Eligibility Fee   [facility] Children up to age 18 and Pregnant woman Sliding Fee   [address] [facility]   [phone] [facility]   Assured Access   Private Insurance     Name Eligibility Fee   [facility] Children. Adults on Walk-in basis only Sliding Fee   [address] [facility]   [phone]     Name Eligibility Fee   [facility] Anyone Sliding Fee   [address] [place] [address]   [phone] Insurance     Name Eligibility Fee   [facility] Anyone [place]   [address] [facility] Sliding Fee   [phone]     Name Eligibility Fee   [facility] Anyone without Insurance Free   [address] [place] [address]   [phone]     Name Eligibility Fee   [facility] Anyone [place]   [address] [place] [address] Assured Access   Sliding Fee     Name Eligibility Fee   [facility] Anyone [place] for children Only   [address] [facility] Reduce fee - NO sliding fee   [phone]     Name Eligibility Fee   [facility] Anyone  without Insurance Sliding Fee   [address] [facility]   [phone]     [place]   Name Eligibility Fee   [facility] Anyone Self Pay   [address]   [phone] [place] Care     [place]   Name Eligibility Fee   [facility] Anyone Sliding Fee   [address] [facility]   [phone]     Name Eligibility Fee   [facility] Anyone [place] Care   [address] [facility]   [phone] Self-pay     Name Eligibility Fee   [facility] Anyone Sliding Fee   [address] [facility]   [phone] Insurance     Name Eligibility Fee   [facility] Anyone Sliding Fee   [address] [facility]   [phone] Insurance     Name Eligibility Fee   [facility] Anyone Free   [address] [facility]   [phone]     Name Eligibility Fee   [facility] Anyone Sliding Fee   [address] [facility]   [phone] Insurance     Name Eligibility Fee   [facility] Anyone age 55+ Reduced Fee   [address] [facility]   [phone] Insurance     [facility]   Name Eligibility Fee   [facility] Handicapped, Donated Dental Services (DDS) Program Must be disable, elderly, or medically compromised and have no other way of paying for dental care Free for those who qualify   [phone]     Name Web Site   [place] [employer] www.mndental.org   [phone]

## 2024-05-15 ENCOUNTER — HOSPITAL ENCOUNTER (EMERGENCY)
Facility: HOSPITAL | Age: 24
Discharge: HOME | End: 2024-05-15
Attending: EMERGENCY MEDICINE | Admitting: EMERGENCY MEDICINE

## 2024-05-15 VITALS
WEIGHT: 120 LBS | HEART RATE: 86 BPM | TEMPERATURE: 99.4 F | RESPIRATION RATE: 16 BRPM | DIASTOLIC BLOOD PRESSURE: 61 MMHG | BODY MASS INDEX: 19.99 KG/M2 | OXYGEN SATURATION: 97 % | HEIGHT: 65 IN | SYSTOLIC BLOOD PRESSURE: 119 MMHG

## 2024-05-15 DIAGNOSIS — H01.112 ALLERGIC DERMATITIS OF UPPER AND LOWER LIDS OF BOTH EYES: ICD-10-CM

## 2024-05-15 DIAGNOSIS — H01.115 ALLERGIC DERMATITIS OF UPPER AND LOWER LIDS OF BOTH EYES: ICD-10-CM

## 2024-05-15 DIAGNOSIS — H01.114 ALLERGIC DERMATITIS OF UPPER AND LOWER LIDS OF BOTH EYES: ICD-10-CM

## 2024-05-15 DIAGNOSIS — H01.111 ALLERGIC DERMATITIS OF UPPER AND LOWER LIDS OF BOTH EYES: ICD-10-CM

## 2024-05-15 PROCEDURE — 99283 EMERGENCY DEPT VISIT LOW MDM: CPT

## 2024-05-15 PROCEDURE — 250N000013 HC RX MED GY IP 250 OP 250 PS 637: Performed by: EMERGENCY MEDICINE

## 2024-05-15 RX ORDER — POLYETHYLENE GLYCOL 400 AND PROPYLENE GLYCOL 4; 3 MG/ML; MG/ML
1 GEL OPHTHALMIC
Qty: 10 ML | Refills: 0 | Status: SHIPPED | OUTPATIENT
Start: 2024-05-15

## 2024-05-15 RX ORDER — LORATADINE 10 MG/1
10 TABLET ORAL DAILY
Qty: 30 TABLET | Refills: 0 | Status: SHIPPED | OUTPATIENT
Start: 2024-05-15

## 2024-05-15 RX ORDER — LORATADINE 10 MG/1
10 TABLET ORAL ONCE
Status: COMPLETED | OUTPATIENT
Start: 2024-05-15 | End: 2024-05-15

## 2024-05-15 RX ORDER — LOTEPREDNOL ETABONATE 5 MG/ML
1 SUSPENSION/ DROPS OPHTHALMIC 4 TIMES DAILY
Status: DISCONTINUED | OUTPATIENT
Start: 2024-05-15 | End: 2024-05-16 | Stop reason: HOSPADM

## 2024-05-15 RX ADMIN — LORATADINE 10 MG: 10 TABLET ORAL at 23:38

## 2024-05-15 RX ADMIN — LOTEPREDNOL ETABONATE 1 DROP: 5 SUSPENSION/ DROPS OPHTHALMIC at 23:39

## 2024-05-15 ASSESSMENT — ENCOUNTER SYMPTOMS
RHINORRHEA: 0
COUGH: 0
EYE ITCHING: 1

## 2024-05-15 ASSESSMENT — COLUMBIA-SUICIDE SEVERITY RATING SCALE - C-SSRS
1. IN THE PAST MONTH, HAVE YOU WISHED YOU WERE DEAD OR WISHED YOU COULD GO TO SLEEP AND NOT WAKE UP?: NO
6. HAVE YOU EVER DONE ANYTHING, STARTED TO DO ANYTHING, OR PREPARED TO DO ANYTHING TO END YOUR LIFE?: NO
2. HAVE YOU ACTUALLY HAD ANY THOUGHTS OF KILLING YOURSELF IN THE PAST MONTH?: NO

## 2024-05-15 ASSESSMENT — ACTIVITIES OF DAILY LIVING (ADL)
ADLS_ACUITY_SCORE: 33
ADLS_ACUITY_SCORE: 33

## 2024-05-16 NOTE — ED TRIAGE NOTES
Patient states since the beginning of the month his bilateral eyes have been swollen, dry and hot.  He has been using a OTC eye cream without result.       Triage Assessment (Adult)       Row Name 05/15/24 9761          Triage Assessment    Airway WDL WDL        Respiratory WDL    Respiratory WDL WDL        Skin Circulation/Temperature WDL    Skin Circulation/Temperature WDL WDL        Cardiac WDL    Cardiac WDL WDL        Peripheral/Neurovascular WDL    Peripheral Neurovascular WDL WDL        Cognitive/Neuro/Behavioral WDL    Cognitive/Neuro/Behavioral WDL WDL

## 2024-05-16 NOTE — ED PROVIDER NOTES
NAME: Po Salmeron  AGE: 23 year old male  YOB: 2000  MRN: [id number]  EVALUATION DATE & TIME: 5/15/2024 10:02 PM    PCP: System, Provider Not In    ED PROVIDER: Gordon Dewey M.D.      Chief Complaint   Patient presents with    Eye Problem         FINAL IMPRESSION:  1. Allergic dermatitis of upper and lower lids of both eyes        MEDICAL DECISION MAKING:    10:52 PM I met with the patient, obtained history, performed an initial exam, and discussed the plan for discharge.  Patient was clinically assessed and consented to treatment. After assessment, medical decision making and workup were discussed with the patient. The patient was agreeable to plan for testing, workup, and treatment.  Pertinent Labs & Imaging studies reviewed. (See chart for details)       Medical Decision Making  Obtained supplemental history:Supplemental history obtained?: Documented in chart and Family Member/Significant Other  Reviewed external records: External records reviewed?: No  Care impacted by chronic illness:Other: Allergies   Care significantly affected by social determinants of health:N/A  Did you consider but not order tests?: In addition to work-up documented, I considered the following work up:   Did you interpret images independently?: Independent interpretation of ECG and images noted in documentation, when applicable.  Consultation discussion with other provider:Did you involve another provider (consultant, , pharmacy, etc.)?: No  Discharge. I prescribed additional prescription strength medication(s) as charted. See documentation for any additional details.    Po Salmeron is a 23 year old male who presents with eye problem.   Differential diagnosis includes but not limited to allergic conjunctivitis, allergic blepharitis, infectious conjunctivitis, periorbital cellulitis.  Patient presenting with inflammation of bilateral eyelids and conjunctiva.  Patient likely with findings of allergic  "blepharitis and conjunctivitis.  Unlikely to be infectious given the ongoing nature.  I discussed with patient and he also often has watery itchy eyes or runny nose that is been going on all spring.  No purulent exudate or drainage.  After discussion with patient he was started on Claritin and I will give him a 2-day treatment of low-dose steroid drops to help with the current inflammation but he is instructed to stop these after 2 days and then use artificial tears or rewetting drops for the dry eyes along with Claritin to control the allergies.  Patient comfortable with plan and after discussion will be discharged home with medications.    0 minutes of critical care time    MEDICATIONS GIVEN IN THE EMERGENCY:  Medications   loratadine (CLARITIN) tablet 10 mg (10 mg Oral $Given 5/15/24 2433)       NEW PRESCRIPTIONS STARTED AT TODAY'S ER VISIT:  Discharge Medication List as of 5/15/2024 11:41 PM        START taking these medications    Details   loratadine (CLARITIN) 10 MG tablet Take 1 tablet (10 mg) by mouth daily, Disp-30 tablet, R-0, Local PrintRecommend daily use during spring/fall if experiencing seasonal allergy symptoms.      polyethyl glycol-propyl glycol (SYSTANE) 0.4-0.3 % Place 1 drop into both eyes every hour as needed (dry eyes), Disp-10 mL, R-0, Local Print                =================================================================    HPI    Patient information was obtained from: Patient and patient's family member    Use of : N/A       Po Salmeron is a 23 year old male with a past medical history of allergic rhinitis, eosinophilia, asthma, who presents with eyelid irritation     Patient reports that for the past month the bilateral periorbital region of his eyes will become swollen, irritated, and red \"out of nowhere\".  The patient's family member states this has been going on for the past 2 months and that they have been using Neosporin and an eyewash without much relief.  Patient " "also reports along with these episodes his eyes will itch and will feel \"very dry\".  The patient reports he grew up around here denies a history of seasonal allergies.  The patient denies any runny nose or cough.    REVIEW OF SYSTEMS   Review of Systems   HENT:  Negative for rhinorrhea.    Eyes:  Positive for itching.        Swelling, redness, and burning around eyes   Respiratory:  Negative for cough.         PAST MEDICAL HISTORY:  Past Medical History:   Diagnosis Date    Uncomplicated asthma        PAST SURGICAL HISTORY:  History reviewed. No pertinent surgical history.    CURRENT MEDICATIONS:    No current facility-administered medications for this encounter.    Current Outpatient Medications:     loratadine (CLARITIN) 10 MG tablet, Take 1 tablet (10 mg) by mouth daily, Disp: 30 tablet, Rfl: 0    polyethyl glycol-propyl glycol (SYSTANE) 0.4-0.3 %, Place 1 drop into both eyes every hour as needed (dry eyes), Disp: 10 mL, Rfl: 0    chlorhexidine (PERIDEX) 0.12 % solution, Swish and spit 15 mLs in mouth 2 times daily, Disp: 118 mL, Rfl: 0    ibuprofen (ADVIL/MOTRIN) 600 MG tablet, Take 1 tablet (600 mg) by mouth every 6 hours as needed for moderate pain, Disp: 30 tablet, Rfl: 0    ALLERGIES:  No Known Allergies    FAMILY HISTORY:  History reviewed. No pertinent family history.    SOCIAL HISTORY:   Social History     Socioeconomic History    Marital status:        PHYSICAL EXAM:    Vitals: /61   Pulse 86   Temp 99.4  F (37.4  C) (Temporal)   Resp 16   Ht 1.651 m (5' 5\")   Wt 54.4 kg (120 lb)   SpO2 97%   BMI 19.97 kg/m     Physical Exam  Vitals and nursing note reviewed.   Constitutional:       General: He is not in acute distress.     Appearance: Normal appearance. He is normal weight. He is not ill-appearing or toxic-appearing.   HENT:      Head: Normocephalic.      Nose: Nose normal.      Mouth/Throat:      Mouth: Mucous membranes are moist.      Pharynx: Oropharynx is clear.   Eyes:      " General: Allergic shiner present. No scleral icterus.        Right eye: No discharge.         Left eye: No discharge.      Extraocular Movements: Extraocular movements intact.      Pupils: Pupils are equal, round, and reactive to light.     Pulmonary:      Effort: No respiratory distress.   Musculoskeletal:         General: Normal range of motion.      Cervical back: Normal range of motion and neck supple. No tenderness.   Lymphadenopathy:      Cervical: No cervical adenopathy.   Skin:     General: Skin is warm and dry.      Findings: No erythema or rash.   Neurological:      Mental Status: He is alert.      Cranial Nerves: No cranial nerve deficit.   Psychiatric:         Behavior: Behavior normal.           LAB:  All pertinent labs reviewed and interpreted.  Labs Ordered and Resulted from Time of ED Arrival to Time of ED Departure - No data to display    RADIOLOGY:  No orders to display         I, Cherelle Yun, am serving as a scribe to document services personally performed by Dr. Gordon Dewey  based on my observation and the provider's statements to me. I, Gordon Dewey MD attest that Cherelle Yun is acting in a scribe capacity, has observed my performance of the services and has documented them in accordance with my direction.      Gordon Dewey M.D.  Emergency Medicine  [facility] EMERGENCY DEPARTMENT  [facility] [id number] [phone]  Dept: [phone]       Gordon Dewey MD  05/17/24 4625

## 2024-05-21 ENCOUNTER — HOSPITAL ENCOUNTER (EMERGENCY)
Facility: HOSPITAL | Age: 24
Discharge: HOME | End: 2024-05-21
Attending: EMERGENCY MEDICINE | Admitting: EMERGENCY MEDICINE

## 2024-05-21 VITALS
DIASTOLIC BLOOD PRESSURE: 59 MMHG | SYSTOLIC BLOOD PRESSURE: 122 MMHG | HEART RATE: 101 BPM | OXYGEN SATURATION: 97 % | BODY MASS INDEX: 19.97 KG/M2 | RESPIRATION RATE: 16 BRPM | TEMPERATURE: 97.8 F | WEIGHT: 120 LBS

## 2024-05-21 DIAGNOSIS — H01.115 ALLERGIC DERMATITIS OF UPPER AND LOWER LIDS OF BOTH EYES: ICD-10-CM

## 2024-05-21 DIAGNOSIS — H01.112 ALLERGIC DERMATITIS OF UPPER AND LOWER LIDS OF BOTH EYES: ICD-10-CM

## 2024-05-21 DIAGNOSIS — H01.114 ALLERGIC DERMATITIS OF UPPER AND LOWER LIDS OF BOTH EYES: ICD-10-CM

## 2024-05-21 DIAGNOSIS — H01.111 ALLERGIC DERMATITIS OF UPPER AND LOWER LIDS OF BOTH EYES: ICD-10-CM

## 2024-05-21 PROCEDURE — 99283 EMERGENCY DEPT VISIT LOW MDM: CPT

## 2024-05-21 RX ORDER — BENZOCAINE/MENTHOL 6 MG-10 MG
LOZENGE MUCOUS MEMBRANE 2 TIMES DAILY
Qty: 30 G | Refills: 0 | Status: SHIPPED | OUTPATIENT
Start: 2024-05-21

## 2024-05-21 ASSESSMENT — ENCOUNTER SYMPTOMS
VOICE CHANGE: 0
SHORTNESS OF BREATH: 0
ABDOMINAL PAIN: 0
SINUS PRESSURE: 0
COUGH: 0
SORE THROAT: 0
RHINORRHEA: 0
FEVER: 0
TROUBLE SWALLOWING: 0

## 2024-05-21 ASSESSMENT — ACTIVITIES OF DAILY LIVING (ADL)
ADLS_ACUITY_SCORE: 33
ADLS_ACUITY_SCORE: 35

## 2024-05-21 ASSESSMENT — COLUMBIA-SUICIDE SEVERITY RATING SCALE - C-SSRS
6. HAVE YOU EVER DONE ANYTHING, STARTED TO DO ANYTHING, OR PREPARED TO DO ANYTHING TO END YOUR LIFE?: NO
1. IN THE PAST MONTH, HAVE YOU WISHED YOU WERE DEAD OR WISHED YOU COULD GO TO SLEEP AND NOT WAKE UP?: NO
2. HAVE YOU ACTUALLY HAD ANY THOUGHTS OF KILLING YOURSELF IN THE PAST MONTH?: NO

## 2024-05-21 NOTE — ED TRIAGE NOTES
Patient states seen here recently for same thing.  C/o bilateral eye burning and itchiness.  Was given OTC meds to try but states are not helping.  Reports was told to return if symptoms worsen- states swelling and redness are worse.

## 2024-05-21 NOTE — DISCHARGE INSTRUCTIONS
You can use the hydrocortisone sparingly up to 2 times a day for 5-7 days on the skin around the eye, but be careful to not get any in your eyes.   Stop using the neosporin and any other skin products that you were using before this reaction.

## 2024-05-21 NOTE — ED NOTES
EMERGENCY DEPARTMENT ENCOUNTER       ED Course & Medical Decision Making     I saw and examined the patient.  This appears to be an isolated allergic reaction to the eyelids bilaterally and there is no other sign of systemic reaction.  He has no complaints to his eyes themselves/vision problems. No signs of systemic symptoms or renal problems.    I do not feel that he would benefit from any testing today.    Given that he was using a new type of skin care products leading up to this reaction and on his previous visit he had swelling largely of his entire face and now it has isolated more to the eyelids.  I suspect that the Neosporin that he has been applying to this area is actually worsening/continuing the problem    I recommended that he discontinue using this explained and that this process is not bacterial and the Neosporin is likely harming him.    He is requesting a cream to treat this and did not want to try a course of oral prednisone.  I explained that he cannot get this in his eyes but he could use some hydrocortisone cream sparingly to the area for a few days    He will follow-up with an allergy clinic but if anything worsens he can return here.        Medical Decision Making  Obtained supplemental history:Supplemental history obtained?: No  Reviewed external records: External records reviewed?: Documented in chart  Care impacted by chronic illness:N/A  Care significantly affected by social determinants of health:N/A  Did you consider but not order tests?: Work up considered but not performed and documented in chart, if applicable considered renal function testing and serum protein levels but no peripheral edema no reason to pursue this at this time.          Prior to making a final disposition on this patient the results of patient's tests and other diagnostic studies were discussed with the patient. All questions were answered. Patient expressed understanding of the plan and was amenable to  it.    Medications - No data to display    Final Impression     1. Allergic dermatitis of upper and lower lids of both eyes            Chief Complaint     Chief Complaint   Patient presents with    Eye Problem       Patient states seen here recently for same thing.  C/o bilateral eye burning and itchiness.  Was given OTC meds to try but states are not helping.  Reports was told to return if symptoms worsen- states swelling and redness are worse.             HPI       Po Salmeron is a pleasant 23 year old male who presents to the emergency department today for evaluation of swelling of his eyelids bilaterally.  This started about a week ago and he had swelling of nearly his entire face and he came to the emergency department where he was seen and started on Claritin for seasonal allergies.    He is taking the Claritin and he has improved but he still noticing swelling of just his eyelids bilaterally.  It seems to fluctuate subtly throughout the day without any known precipitating or worsening known causes.     No ankle swelling.  No runny nose, sore throat, cough, fevers, itching of the eyeballs or vision changes.  No other complaint            Past Medical History     Past Medical History:   Diagnosis Date    Uncomplicated asthma      No past surgical history on file.  No family history on file.        Relevant past medical, surgical, family and social history as documented above, has been reviewed and discussed with patient. No changes or additions, unless otherwise noted in the HPI.    Current Medications     hydrocortisone (CORTAID) 1 % external cream  chlorhexidine (PERIDEX) 0.12 % solution  ibuprofen (ADVIL/MOTRIN) 600 MG tablet  loratadine (CLARITIN) 10 MG tablet  polyethyl glycol-propyl glycol (SYSTANE) 0.4-0.3 %        Allergies     No Known Allergies    Review of Systems     Review of Systems   Constitutional:  Negative for fever.   HENT:  Negative for rhinorrhea, sinus pressure, sore throat, trouble  swallowing and voice change.    Respiratory:  Negative for cough and shortness of breath.    Cardiovascular:  Negative for chest pain and leg swelling.   Gastrointestinal:  Negative for abdominal pain.   Skin:  Negative for rash.   All other systems reviewed and are negative.       Remainder of systems reviewed, unless noted in HPI all others negative.    Physical Exam     /59   Pulse 101   Temp 97.8  F (36.6  C) (Oral)   Resp 16   Wt 54.4 kg (120 lb)   SpO2 97%   BMI 19.97 kg/m      Physical Exam  Vitals and nursing note reviewed.   Constitutional:       General: He is not in acute distress.  HENT:      Head: Normocephalic.      Right Ear: Tympanic membrane normal.      Left Ear: Tympanic membrane normal.      Nose: Nose normal.   Eyes:      General: No scleral icterus.        Right eye: No discharge.         Left eye: No discharge.      Conjunctiva/sclera: Conjunctivae normal.      Pupils: Pupils are equal, round, and reactive to light.      Comments: Mild bilateral periorbital edema   Cardiovascular:      Rate and Rhythm: Normal rate.   Pulmonary:      Effort: Pulmonary effort is normal.   Musculoskeletal:      Cervical back: Neck supple.   Skin:     Findings: No rash.   Neurological:      Mental Status: He is alert. Mental status is at baseline.   Psychiatric:         Mood and Affect: Mood normal.             Labs & Imaging         Labs Ordered and Resulted from Time of ED Arrival to Time of ED Departure - No data to display           James Collier MD  Emergency Medicine  [facility] EMERGENCY DEPARTMENT  [address] [place] [address]  [phone]  5/21/2024         James Collier MD  05/21/24 6703